# Patient Record
(demographics unavailable — no encounter records)

---

## 2025-03-20 NOTE — ASSESSMENT
[FreeTextEntry1] : Hyperlipidemia AI- trace ASHD- nonobstructive Bilateral carotid disease Hypertension DM

## 2025-03-20 NOTE — REASON FOR VISIT
[FreeTextEntry1] : Patient presents for a follow up Cardiology visit with me after his Cardiologist retired from practice. The patient has a known history of ASHD, described as non obstructive on prior coronary angiogram many years ago and bilateral carotid disease. He is here to review the results of his lab results.

## 2025-03-20 NOTE — PHYSICAL EXAM

## 2025-03-20 NOTE — DISCUSSION/SUMMARY
[FreeTextEntry1] : Exercise stress thallium, TTE, and lab work were reviewed with the patient and his spouseon his prior office visit. Medical therapy is advised. Patient was instructed to target his T. Cholesterol to less than 200 mg/dl and LDL cholesterol to less than 70 mg/dl. His lipid levels are at target range. He is advised to improve his serum glucose level. Exercise and weight loss was advised. Maintain cardiac medications.  EKG: NSR 59 bpm, RBBB EKG was interpreted and reviewed with the patient. Patient was advised to repeat a BMP, CBC , fasting lipid profile and hepatic panel. RV in 6 months. [EKG obtained to assist in diagnosis and management of assessed problem(s)] : EKG obtained to assist in diagnosis and management of assessed problem(s)

## 2025-03-20 NOTE — HISTORY OF PRESENT ILLNESS
[FreeTextEntry1] : History of bilateral carotid disease, less than 50% Hyperlipidemia Dilated thoracic aortic aneurysm ASHD- nonobstructive documented in the past RBBB Hypertension DM followed by Endocrinology, Dr. Wallace Cigarette smoker

## 2025-04-07 NOTE — HISTORY OF PRESENT ILLNESS
[TextBox_4] : 71-year-old man with history of hypertension, hyperlipidemia, prediabetes, former heavy smoker of more than 30 pack years and quit about 5 years ago, occasionally vapes, had COVID about a year ago as well, has occasional shortness of breath particularly with long walks, no wheezing on exam, denies any cough, not currently on inhalers, presenting to establish care.

## 2025-04-07 NOTE — ASSESSMENT
[FreeTextEntry1] : Former heavy smoker History of at least 30 pack years and quit within the last 15 years Qualifies for low-dose CT for lung cancer screening Shortness of breath on exertion could be related to COPD Check PFTs Carbon oxide level noted on the blood work but I doubt he is hypercapnic He is on room air and O2 saturation is 99% Lungs are clear on exam No recent admission for respiratory issues Not on any inhalers 1 month follow-up for results

## 2025-04-14 NOTE — PLAN
[FreeTextEntry1] : Plan: -Low Dose CT chest for lung cancer screening -Follow up with patient and his referring provider after his LDCT results have been reviewed by the multi-disciplinary clinical team -Encouraged continued smoking abstinence     Engaged in shared decision making with . CALISTA RAMIREZ . Answered all questions. He verbalized understanding and agreement. He knows to call back with any questions or concerns   
complains of pain/discomfort

## 2025-04-14 NOTE — HISTORY OF PRESENT ILLNESS
[Former] : Former [>=20 Pack-Years] : Twenty pack years or greater smoking history: Yes [TextBox_13] : Referred by Dr. Luis Crouch   Mr. CALISTA RAMIREZ  is a 71 year old man with a history of HLD, COPD, DM.   He  was seen in the office by   for review of eligibility for, as well as, discussion of Low-Dose CT lung cancer screening program. Over the telephone today we reviewed and confirmed that the patient meets screening eligibility criteria:  Mr. RAMIREZ denies any signs or symptoms of lung cancer including new cough, change in cough, hemoptysis and unintentional weight loss.   Mr. RAMIREZ denies any personal history of lung cancer. No lung cancer in a 1st degree relative. Denies any history of lung disease. Denies any history of occupational exposures.  [YearQuit] : 2020 [PacksperDay] : 1 [N_Years] : 40 [PacksperYear] : 40

## 2025-05-19 NOTE — ASSESSMENT
[FreeTextEntry1] : Former heavy smoker - recently stopped vaping  History of at least 30 pack years and quit within the last 15 years LDCT done with a 6 mm RLL nodule  CT in 3 months ordered  Shortness of breath on exertion - resolved after he quit vaping  PFTs with mild obstruction, moderate air trapping, normal DLCO Lungs are clear on exam No recent admission for respiratory issues Follow-up in 3 months for repeat CT results

## 2025-05-19 NOTE — HISTORY OF PRESENT ILLNESS
[TextBox_4] : 71-year-old man with history of hypertension, hyperlipidemia, prediabetes, former heavy smoker of more than 30 pack years and quit about 5 years ago, occasionally vapes, had COVID about a year ago as well, has occasional shortness of breath particularly with long walks, no wheezing on exam, denies any cough, not currently on inhalers, presenting to establish care.  5/19/2025: PFTs were done with mild obstruction, normal DLCO, moderate air trapping, remains asymptomatic with clear lungs on exam today.  CT of the chest with upper lobe predominant emphysema.  Right lower lobe 6 mm lung nodule noted.